# Patient Record
Sex: MALE | Race: WHITE
[De-identification: names, ages, dates, MRNs, and addresses within clinical notes are randomized per-mention and may not be internally consistent; named-entity substitution may affect disease eponyms.]

---

## 2017-02-26 ENCOUNTER — HOSPITAL ENCOUNTER (EMERGENCY)
Dept: HOSPITAL 26 - MED | Age: 47
Discharge: HOME | End: 2017-02-26
Payer: COMMERCIAL

## 2017-02-26 VITALS — BODY MASS INDEX: 28.93 KG/M2 | HEIGHT: 66 IN | WEIGHT: 180 LBS

## 2017-02-26 VITALS — SYSTOLIC BLOOD PRESSURE: 113 MMHG | DIASTOLIC BLOOD PRESSURE: 67 MMHG

## 2017-02-26 VITALS — SYSTOLIC BLOOD PRESSURE: 110 MMHG | DIASTOLIC BLOOD PRESSURE: 66 MMHG

## 2017-02-26 DIAGNOSIS — G89.29: ICD-10-CM

## 2017-02-26 DIAGNOSIS — N50.819: ICD-10-CM

## 2017-02-26 DIAGNOSIS — E11.9: ICD-10-CM

## 2017-02-26 DIAGNOSIS — M25.511: Primary | ICD-10-CM

## 2017-02-26 NOTE — NUR
Patient discharged with v/s stable. Written and verbal after care instructions 
given and explained. 

Patient alert, oriented and verbalized understanding of instructions. 
Ambulatory with steady gait. All questions addressed prior to discharge. ID 
band removed. Patient advised to follow up with PMD. Rx of FLEXERIL, MOTRIN, 
ULTRAM given. Patient educated on indication of medication including possible 
reaction and side effects. Opportunity to ask questions provided and answered.

## 2017-02-26 NOTE — NUR
PATIENT PRESENTS TO ED WITH C/O RIGHT SIDED SHOULDER BLADE PAIN X 6 MONTHS, 
REPORTS TESTICULAR PAIN WHEN LIFTING THINGS . DENIES N/V/D; SKIN IS 
PINK/WARM/DRY; AAOX4 WITH EVEN AND STEADY GAIT; LUNGS CLEAR BL; HR EVEN AND 
REGULAR; PT DENIES ANY FEVER, CP, SOB, OR COUGH AT THIS TIME; PATIENT STATES 
PAIN OF 9/10 AT THIS TIME; VSS; PATIENT POSITIONED FOR COMFORT; HOB ELEVATED; 
BEDRAILS UP X2; BED DOWN. ER P.A. AT BEDSIDE FOR EVAL.

## 2019-07-31 ENCOUNTER — HOSPITAL ENCOUNTER (EMERGENCY)
Dept: HOSPITAL 1 - ED | Age: 49
Discharge: HOME | End: 2019-07-31
Payer: COMMERCIAL

## 2019-07-31 VITALS — SYSTOLIC BLOOD PRESSURE: 118 MMHG | DIASTOLIC BLOOD PRESSURE: 73 MMHG

## 2019-07-31 VITALS — HEIGHT: 66 IN | BODY MASS INDEX: 28.12 KG/M2 | WEIGHT: 175 LBS

## 2019-07-31 DIAGNOSIS — J02.9: Primary | ICD-10-CM

## 2020-11-16 ENCOUNTER — HOSPITAL ENCOUNTER (EMERGENCY)
Dept: HOSPITAL 1 - ED | Age: 50
Discharge: HOME | End: 2020-11-16
Payer: COMMERCIAL

## 2020-11-16 VITALS — SYSTOLIC BLOOD PRESSURE: 115 MMHG | DIASTOLIC BLOOD PRESSURE: 76 MMHG

## 2020-11-16 VITALS — HEIGHT: 66 IN | BODY MASS INDEX: 27.48 KG/M2 | WEIGHT: 171 LBS
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
## 2020-11-16 DIAGNOSIS — S60.222A: Primary | ICD-10-CM

## 2020-11-16 DIAGNOSIS — I10: ICD-10-CM
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
## 2020-11-16 DIAGNOSIS — E11.9: ICD-10-CM
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
## 2021-08-12 ENCOUNTER — HOSPITAL ENCOUNTER (EMERGENCY)
Dept: HOSPITAL 26 - MED | Age: 51
Discharge: HOME | End: 2021-08-12
Payer: COMMERCIAL

## 2021-08-12 VITALS — SYSTOLIC BLOOD PRESSURE: 115 MMHG | DIASTOLIC BLOOD PRESSURE: 61 MMHG

## 2021-08-12 VITALS — HEIGHT: 66 IN | WEIGHT: 163.37 LBS | BODY MASS INDEX: 26.25 KG/M2

## 2021-08-12 VITALS — DIASTOLIC BLOOD PRESSURE: 77 MMHG | SYSTOLIC BLOOD PRESSURE: 128 MMHG

## 2021-08-12 DIAGNOSIS — M54.6: Primary | ICD-10-CM

## 2021-08-12 DIAGNOSIS — E11.9: ICD-10-CM

## 2021-08-12 NOTE — NUR
51/M presents to ED with c/o right back pain radiating to chest. Patient states 
for 1 week he has been having worsening back pain, denies injury or trauma. No 
redness, swelling or bruising noted to site, right back is tender to touch. 
Patient able to ambulate without assistance, states 7/10 sharp pain with 
movement. Patient alert and oriented x4, answering questions appropriately.

## 2021-08-12 NOTE — NUR
Patient discharged with v/s stable. Written and verbal after care instructions 
given and explained. 

Patient alert, oriented and verbalized understanding of instructions. 
Ambulatory with steady gait. All questions addressed prior to discharge. ID 
band removed. Patient advised to follow up with PMD. Rx of LIDODERM PATCH AND 
IBUPROFEN given. Patient educated on indication of medication including 
possible reaction and side effects. Opportunity to ask questions provided and 
answered.